# Patient Record
Sex: MALE | URBAN - METROPOLITAN AREA
[De-identification: names, ages, dates, MRNs, and addresses within clinical notes are randomized per-mention and may not be internally consistent; named-entity substitution may affect disease eponyms.]

---

## 2017-04-10 ENCOUNTER — IMPORTED ENCOUNTER (OUTPATIENT)
Dept: URBAN - METROPOLITAN AREA CLINIC 1 | Facility: CLINIC | Age: 67
End: 2017-04-10

## 2017-04-10 PROBLEM — H40.023: Noted: 2017-04-10

## 2017-04-10 PROBLEM — H25.813: Noted: 2017-04-10

## 2017-04-10 PROBLEM — E11.9: Noted: 2017-04-10

## 2017-04-10 PROBLEM — Z79.4: Noted: 2017-04-10

## 2017-04-10 PROCEDURE — 92250 FUNDUS PHOTOGRAPHY W/I&R: CPT

## 2017-04-10 PROCEDURE — 92014 COMPRE OPH EXAM EST PT 1/>: CPT

## 2017-04-10 NOTE — PATIENT DISCUSSION
1.  DM Type II (Insulin) without sign of diabetic retinopathy and no blot heme on dilated retinal examination today OU No Macular Edema:  Discussed the pathophysiology of diabetes and its effect on the eye and risk of blindness. Stressed the importance of strong glucose control. Advised of importance of at least yearly dilated examinations but to contact us immediately for any problems or concerns. 2. Glaucoma Suspect OU (CD 0.75 OU) : IOP 18 OU today. Patient is considered high risk. Condition was discussed with patient and patient understands. Will continue to monitor patient for any progression in condition. Patient was advised to call us with any problems questions or concerns. Baseline disc photos done today 3. Cataract OU: Observe for now without intervention. The patient was advised to contact us if any change or worsening of visionReturn for an appointment in 1 month 10/OCT/24-2 VF with Dr. Cailin Mackenzie.

## 2017-05-10 ENCOUNTER — IMPORTED ENCOUNTER (OUTPATIENT)
Dept: URBAN - METROPOLITAN AREA CLINIC 1 | Facility: CLINIC | Age: 67
End: 2017-05-10

## 2017-05-10 PROBLEM — H25.813: Noted: 2017-05-10

## 2017-05-10 PROBLEM — H40.023: Noted: 2017-05-10

## 2017-05-10 PROCEDURE — 92012 INTRM OPH EXAM EST PATIENT: CPT

## 2017-05-10 PROCEDURE — 92133 CPTRZD OPH DX IMG PST SGM ON: CPT

## 2017-05-10 PROCEDURE — 92083 EXTENDED VISUAL FIELD XM: CPT

## 2017-05-10 NOTE — PATIENT DISCUSSION
1.  Glaucoma Suspect OU (CD 0.75 OU) : IOP 19 OU today. OCT shows minimal RNFL thinning today HVF shows possible early nasal step OS normal OD. Patient is considered high risk. Condition was discussed with patient and patient understands. Will continue to monitor patient for any progression in condition. Patient was advised to call us with any problems questions or concerns. 2.  Cataract OU: Observe for now without intervention. The patient was advised to contact us if any change or worsening of vision3. H/o DM w/o DR Marly Harding for an appointment in 6 months 10/DFE/glare with Dr. Mynor Angeal.

## 2018-05-09 ENCOUNTER — IMPORTED ENCOUNTER (OUTPATIENT)
Dept: URBAN - METROPOLITAN AREA CLINIC 1 | Facility: CLINIC | Age: 68
End: 2018-05-09

## 2018-05-09 PROBLEM — E11.3211: Noted: 2018-05-09

## 2018-05-09 PROBLEM — H40.023: Noted: 2018-05-09

## 2018-05-09 PROBLEM — Z79.4: Noted: 2018-05-09

## 2018-05-09 PROBLEM — H25.813: Noted: 2018-05-09

## 2018-05-09 PROBLEM — H04.123: Noted: 2018-05-09

## 2018-05-09 PROBLEM — E11.3292: Noted: 2018-05-09

## 2018-05-09 PROCEDURE — 92133 CPTRZD OPH DX IMG PST SGM ON: CPT

## 2018-05-09 PROCEDURE — 92014 COMPRE OPH EXAM EST PT 1/>: CPT

## 2018-05-09 NOTE — PATIENT DISCUSSION
DM Type II with mild Nonproliferative Diabetic Retinopathy OS No Macular Edema:  Discussed the pathophysiology of diabetes and its effect on the eye and risk of blindness. Stressed the importance of strong glucose control. Advised of importance of at least yearly dilated examinations but to contact us immediately for any problems or concerns.

## 2018-05-09 NOTE — PATIENT DISCUSSION
1.  DM Type II (Taking Insulin). with Moderate Nonproliferative Diabetic Retinopathy OD Macular Edema noted on today's exam:  Discussed the pathophysiology of diabetes and its effect on the eye and risk of blindness. Stressed the importance of strong glucose control. Advised of importance of at least yearly dilated examinations but to contact us immediately for any problems or concerns. Refer to [facility] for eval of new onset DME OD. 2.  DM Type II (Taking Insulin). with mild Nonproliferative Diabetic Retinopathy OS No Macular Edema:  Discussed the pathophysiology of diabetes and its effect on the eye and risk of blindness. Stressed the importance of strong glucose control. Advised of importance of at least yearly dilated examinations but to contact us immediately for any problems or concerns. 3. Dry Eyes OU - Cont ATs TID OU Routinely. 4.  Cataract OU: Observe for now without intervention. The patient was advised to contact us if any change or worsening of vision5. Glaucoma Suspect OU (CD 0.75 OU) : OCT WNL OU Today. IOP stable on no meds. Patient is considered high riskDMV paperwork filled out today- Pt trying to re-certify for CDL. Patient's family member asked whether they could push appt with [facility] out two months from now. Stressed with patient and family member the risk of permanent vision loss due to pushing appointment with [facility] out further. Letter to Shante Reagan for an appointment in 6 mo 10 dfe with Dr. Alivia Silva.

## 2018-05-09 NOTE — PATIENT DISCUSSION
Glaucoma Suspect OU : Patient is considered high risk. Condition was discussed with patient and patient understands. Will continue to monitor patient for any progression in condition. Patient was advised to call us with any problems questions or concerns.

## 2022-04-02 ASSESSMENT — VISUAL ACUITY
OD_SC: 20/70
OD_GLARE: 20/200
OS_GLARE: 20/80
OD_GLARE: 20/400
OD_CC: J3
OS_GLARE: 20/60
OS_GLARE: 20/60
OD_SC: 20/60
OS_CC: 20/30
OS_CC: J3
OD_GLARE: 20/100
OS_CC: J3
OD_CC: 20/70
OS_SC: 20/40
OD_SC: 20/100
OS_CC: J3
OS_SC: 20/40
OU_CC: 20/25
OD_CC: J5
OS_SC: 20/40
OD_CC: J3

## 2022-04-02 ASSESSMENT — KERATOMETRY
OS_K1POWER_DIOPTERS: 40.00
OS_K2POWER_DIOPTERS: 39.50
OD_K2POWER_DIOPTERS: 39.75
OD_AXISANGLE2_DEGREES: 069
OS_AXISANGLE2_DEGREES: 049
OD_K1POWER_DIOPTERS: 39.25
OS_AXISANGLE_DEGREES: 139
OD_AXISANGLE_DEGREES: 159

## 2022-04-02 ASSESSMENT — TONOMETRY
OS_IOP_MMHG: 18
OS_IOP_MMHG: 18
OD_IOP_MMHG: 17
OS_IOP_MMHG: 19
OD_IOP_MMHG: 18
OD_IOP_MMHG: 19

## 2023-01-31 RX ORDER — ERGOCALCIFEROL 1.25 MG/1
50000 CAPSULE ORAL
COMMUNITY

## 2023-01-31 RX ORDER — ATORVASTATIN CALCIUM 80 MG/1
80 TABLET, FILM COATED ORAL DAILY
COMMUNITY

## 2023-01-31 RX ORDER — GLIPIZIDE 10 MG/1
10 TABLET ORAL 2 TIMES DAILY
COMMUNITY

## 2023-01-31 RX ORDER — LOSARTAN POTASSIUM 100 MG/1
100 TABLET ORAL DAILY
COMMUNITY

## 2023-01-31 RX ORDER — CARVEDILOL 25 MG/1
25 TABLET ORAL DAILY
COMMUNITY

## 2023-01-31 RX ORDER — NIFEDIPINE 90 MG/1
90 TABLET, EXTENDED RELEASE ORAL DAILY
COMMUNITY

## 2023-01-31 RX ORDER — FUROSEMIDE 20 MG/1
TABLET ORAL DAILY
COMMUNITY

## 2023-01-31 RX ORDER — IBUPROFEN 200 MG
200 TABLET ORAL 2 TIMES DAILY
COMMUNITY

## 2023-01-31 RX ORDER — ASPIRIN 81 MG/1
TABLET ORAL DAILY
COMMUNITY

## 2023-01-31 RX ORDER — TESTOSTERONE 10 MG/.5G
GEL, METERED TOPICAL
COMMUNITY
Start: 2016-09-06

## 2023-01-31 RX ORDER — TADALAFIL 20 MG/1
20 TABLET ORAL DAILY PRN
COMMUNITY
Start: 2016-12-19

## 2023-01-31 RX ORDER — TESTOSTERONE 16.2 MG/G
20.25 GEL TRANSDERMAL DAILY
COMMUNITY
Start: 2016-12-27

## 2023-01-31 RX ORDER — INSULIN GLARGINE 100 [IU]/ML
65 INJECTION, SOLUTION SUBCUTANEOUS
COMMUNITY

## 2023-01-31 RX ORDER — TESTOSTERONE 30 MG/1.5ML
SOLUTION TOPICAL
COMMUNITY
Start: 2017-01-03

## 2023-01-31 RX ORDER — LEVOTHYROXINE SODIUM 0.15 MG/1
150 TABLET ORAL
COMMUNITY